# Patient Record
Sex: MALE
[De-identification: names, ages, dates, MRNs, and addresses within clinical notes are randomized per-mention and may not be internally consistent; named-entity substitution may affect disease eponyms.]

---

## 2019-11-14 ENCOUNTER — APPOINTMENT (OUTPATIENT)
Dept: HUMAN REPRODUCTION | Facility: CLINIC | Age: 49
End: 2019-11-14

## 2023-01-04 PROBLEM — Z00.00 ENCOUNTER FOR PREVENTIVE HEALTH EXAMINATION: Status: ACTIVE | Noted: 2023-01-04

## 2023-01-14 ENCOUNTER — APPOINTMENT (OUTPATIENT)
Dept: MRI IMAGING | Facility: IMAGING CENTER | Age: 53
End: 2023-01-14
Payer: SELF-PAY

## 2023-01-14 ENCOUNTER — OUTPATIENT (OUTPATIENT)
Dept: OUTPATIENT SERVICES | Facility: HOSPITAL | Age: 53
LOS: 1 days | End: 2023-01-14
Payer: SELF-PAY

## 2023-01-14 DIAGNOSIS — R97.20 ELEVATED PROSTATE SPECIFIC ANTIGEN [PSA]: ICD-10-CM

## 2023-01-14 PROCEDURE — 72197 MRI PELVIS W/O & W/DYE: CPT

## 2023-01-14 PROCEDURE — 76498 UNLISTED MR PROCEDURE: CPT

## 2023-01-14 PROCEDURE — 72197 MRI PELVIS W/O & W/DYE: CPT | Mod: 26

## 2023-01-14 PROCEDURE — 76498P: CUSTOM | Mod: 26

## 2023-01-14 PROCEDURE — A9585: CPT

## 2023-02-06 ENCOUNTER — APPOINTMENT (OUTPATIENT)
Dept: UROLOGY | Facility: CLINIC | Age: 53
End: 2023-02-06
Payer: COMMERCIAL

## 2023-02-06 VITALS
DIASTOLIC BLOOD PRESSURE: 89 MMHG | OXYGEN SATURATION: 97 % | SYSTOLIC BLOOD PRESSURE: 147 MMHG | HEART RATE: 77 BPM | BODY MASS INDEX: 41.75 KG/M2 | WEIGHT: 315 LBS | HEIGHT: 73 IN

## 2023-02-06 VITALS
BODY MASS INDEX: 42.66 KG/M2 | TEMPERATURE: 98.7 F | SYSTOLIC BLOOD PRESSURE: 165 MMHG | HEIGHT: 72 IN | HEART RATE: 111 BPM | WEIGHT: 315 LBS | OXYGEN SATURATION: 98 % | DIASTOLIC BLOOD PRESSURE: 86 MMHG

## 2023-02-06 VITALS — HEIGHT: 73 IN | BODY MASS INDEX: 44.86 KG/M2

## 2023-02-06 DIAGNOSIS — I10 ESSENTIAL (PRIMARY) HYPERTENSION: ICD-10-CM

## 2023-02-06 DIAGNOSIS — Z78.9 OTHER SPECIFIED HEALTH STATUS: ICD-10-CM

## 2023-02-06 DIAGNOSIS — Z83.3 FAMILY HISTORY OF DIABETES MELLITUS: ICD-10-CM

## 2023-02-06 DIAGNOSIS — E78.5 HYPERLIPIDEMIA, UNSPECIFIED: ICD-10-CM

## 2023-02-06 DIAGNOSIS — Z82.49 FAMILY HISTORY OF ISCHEMIC HEART DISEASE AND OTHER DISEASES OF THE CIRCULATORY SYSTEM: ICD-10-CM

## 2023-02-06 PROCEDURE — 99214 OFFICE O/P EST MOD 30 MIN: CPT

## 2023-02-06 RX ORDER — ATORVASTATIN CALCIUM 10 MG/1
10 TABLET, FILM COATED ORAL
Refills: 0 | Status: ACTIVE | COMMUNITY

## 2023-02-06 RX ORDER — SILDENAFIL 20 MG/1
TABLET ORAL
Refills: 0 | Status: ACTIVE | COMMUNITY

## 2023-02-06 RX ORDER — LISINOPRIL 20 MG/1
20 TABLET ORAL
Refills: 0 | Status: ACTIVE | COMMUNITY

## 2023-02-06 NOTE — HISTORY OF PRESENT ILLNESS
[FreeTextEntry1] : Dear CLINT Contreras\par \par Thank you so much for the referral to help care for your patient.\par \par Chief Complaint: Prostate Biopsy consult\par Date of first visit: 02/06/2023\par \par RUDDY NAVARRO is a 52 year old Black man with PMHx hypgonadism (testosterone pellets), HTN, HLD who presents for elevated PSA. His PSA is 4.29 n/ml. MRI on 1/14/23 demonstrates a PIRADS 4 lesion (left mid pzpl) and PIRADS 3 lesion right mid pzpl. He has bilateral pins in his hips. His PSA density is 0.16 ng/ml/cc. He is biopsy naive and denies any known family hx of  prostate cancer. \par \par Last testosterone pellets 12/23/22. His TT on 1/20/23 was 640. Has been taking for years.\par \par PSA Hx:  \par 4.29   12/23/2022. PSAD 0.16 ng/ml/cc\par 4.15   9/23/22 \par \par MRI Hx: \par MRI read 1/14/2023. 26 ml prostate with PIRADS 4 lesion #1 measuring 9 mm in the left, posterior lateral (PZpl), midgland, peripheral zone. PIRADS 3 lesion #2 measuring  measuring 11 mm in the right, posterior lateral (PZpl), midgland, peripheral zone. No LAD No EPE, No Bony Lesions.  The images have been reviewed and clinical implications discussed with the patient. \par \par 02/06/2023\par IPSS  1  QOL  0\par RADHA  19\par \par Prostate cancer screening: the patient and I spoke at length about prostate cancer screening, its risks and its benefits. The patient has 3 (age, race, PSA) risk factors for prostate cancer.  He understands that many men with prostate cancer will die with the disease rather than of it and we also discussed the results large multi-center American and  prostate cancer screening trials. He also understands that PSA in and of itself does not diagnose prostate cancer but only assesses risk to a certain degree. The patient understands that to definitively screen for prostate cancer, a biopsy is required and this procedure has risks, including bleeding, infection, ED and urinary retention. The patient opted to fusion biopsy. \par \par The patient denies fevers, chills, nausea and or vomiting and no unexplained weight loss.\par \par All pertinent laboratory, films and physician notes were reviewed.  Questionnaire results were discussed with patient.

## 2023-02-06 NOTE — ADDENDUM
[FreeTextEntry1] : I, Dr. Dexter, personally performed the evaluation and management (E/M) services for this new patient.  That E/M includes conducting the initial examination, assessing all conditions, and establishing the plan of care.  Today, my ACP, Osiris Woods, was here to observe my evaluation and management services for this patient to be followed going forward.\par \par IRB   MR/TRUS FUSION GUIDED PROSTATE BIOPSY- AN IMPROVED WAY TO DETECT AND QUANTIFY PROSTATE CANCER\par \par Inclusion criteria have been reviewed and it has been determined that the subject has met all inclusion criteria.\par Exclusion criteria have been reviewed and it has been determined that the subject does not meet any exclusion criteria.\par \par The following was discussed during the consent process:\par ·	The fact that the study involves research\par ·	The study schedule and procedures involved\par ·	The main risks of the study, and the fact that all risks may not be known at this time\par ·	New information that may affect the subject’s willingness to continue on the study will be presented as soon as it is available\par ·	Benefits of participating\par ·	Alternatives to participating\par ·	Confidentiality \par ·	Compensation for research-related injury\par ·	Contacts for questions about the study or their rights while on the study\par ·	The fact that the subject’s participation is voluntary - they can refuse or withdraw \par at any time without penalty or loss of benefits.\par \par The subject was given ample time to ask questions prior to signing - all questions were answered to the subject’s satisfaction.\par \par Contact information for research staff was given to the subject.	\par The subject has expressed his/her willingness to participate.	\par \par Opportunity to sign the consent electronically was offered to the subject. Study team to contact the subject to assist with e-consenting though the [employer] platform. \par \par OR  \par \par Subject will sign printed consent on day of procedure.  \par \par

## 2023-02-06 NOTE — PHYSICAL EXAM
[Prostate Tenderness] : the prostate was not tender [No Prostate Nodules] : no prostate nodules [General Appearance - Well Developed] : well developed [General Appearance - Well Nourished] : well nourished [Normal Appearance] : normal appearance [Well Groomed] : well groomed [General Appearance - In No Acute Distress] : no acute distress [] : no respiratory distress [Respiration, Rhythm And Depth] : normal respiratory rhythm and effort [Exaggerated Use Of Accessory Muscles For Inspiration] : no accessory muscle use [Costovertebral Angle Tenderness] : no ~M costovertebral angle tenderness [Urethral Meatus] : meatus normal [Penis Abnormality] : normal circumcised penis [Urinary Bladder Findings] : the bladder was normal on palpation [Scrotum] : the scrotum was normal [Testes Tenderness] : no tenderness of the testes [Testes Mass (___cm)] : there were no testicular masses [Normal Station and Gait] : the gait and station were normal for the patient's age [No Focal Deficits] : no focal deficits [Oriented To Time, Place, And Person] : oriented to person, place, and time [Affect] : the affect was normal [Mood] : the mood was normal [Not Anxious] : not anxious [No Palpable Adenopathy] : no palpable adenopathy [FreeTextEntry1] : bilat 5cc testes

## 2023-02-06 NOTE — ASSESSMENT
[FreeTextEntry1] : 51 yo male with elevated PSA 4.29 ng/ml, MRI with PIRADS 4 and PIRADS 3 lesion, elevated PSAD 0.16 ng/ml/cc, biopsy naive, neg FH, neg FERNANDO. \par \par 1. Book for biopsy\par 2. His BMI is 45-- Dr Wang cleared for [facility]\par \par IRB Notification\par \par The patient has been made aware of the opportunity to participate in our MR US fusion guided biopsy trial testing the next generation biopsy technology.  This is an IRB approved trial (IRB #).  He is already being consented for a standard MR US fusion guided biopsy therefore there is no change in his clinical work flow.  He has been given a copy of the consent to review before the biopsy date and given an opportunity to contact us with any further questions.  He will be consented on the day of the procedure or electronically before the biopsy.\par \par He understands that many men with prostate cancer will die with the disease rather than of it and we also discussed the results large multi-center American and  prostate cancer screening trials. He also understands that PSA in and of itself does not diagnose prostate cancer but only assesses risk to a certain degree. The patient understands that to definitively screen for prostate cancer, a biopsy is required and this procedure has risks, including bleeding, infection, ED and urinary retention. The patient opted to move forward with the biopsy.\par \par The patient is aware to expect hematuria x 2 weeks and upto 4 weeks of hematospermia.  There is a risk of infection albeit much lower than a transrectal approach. In some cases patients can experience erectile dysfunction but this is usually self limiting.  Any fever/chills after the biopsy the patient is to contact the office and go to the ER for an immediate evaluation. He has been given paper instructions outlining these items - which includes medications to avoid prior to surgery.\par \par 1. CBC, BMP, PSA, Covid Test, UA UCx. EKG  \par 2. Medical Clearance\par 3. TP biopsy at [facility]\par 4. follow up 2 weeks after biopsy with his primary urologist or ourselves.\par 5. we will call with the path results once they are resulted.\par \par Thank you very much for allowing me to assist in the care of this patient. Should you have any additional questions or concerns please do not hesitate to contact me.\par \par \par Sincerely,\par \par \par Eldon Dexter D.O.\par Professor of Urology and Radiology\par  of Urology at [facility]\par System Director for Prostate Cancer\par [address] [place] [address], [address] [facility], [address]\par Phone: [phone]\par \par

## 2023-02-07 ENCOUNTER — NON-APPOINTMENT (OUTPATIENT)
Age: 53
End: 2023-02-07

## 2023-02-07 LAB
ANION GAP SERPL CALC-SCNC: 13 MMOL/L
APPEARANCE: CLEAR
BACTERIA: NEGATIVE
BASOPHILS # BLD AUTO: 0.06 K/UL
BASOPHILS NFR BLD AUTO: 0.7 %
BILIRUBIN URINE: NEGATIVE
BLOOD URINE: NEGATIVE
BUN SERPL-MCNC: 18 MG/DL
CALCIUM SERPL-MCNC: 10.6 MG/DL
CHLORIDE SERPL-SCNC: 98 MMOL/L
CO2 SERPL-SCNC: 26 MMOL/L
COLOR: YELLOW
CREAT SERPL-MCNC: 1.35 MG/DL
EGFR: 62 ML/MIN/1.73M2
EOSINOPHIL # BLD AUTO: 0.19 K/UL
EOSINOPHIL NFR BLD AUTO: 2.3 %
GLUCOSE QUALITATIVE U: NEGATIVE
GLUCOSE SERPL-MCNC: 106 MG/DL
HCT VFR BLD CALC: 51.3 %
HGB BLD-MCNC: 16.7 G/DL
HYALINE CASTS: 0 /LPF
IMM GRANULOCYTES NFR BLD AUTO: 0.2 %
KETONES URINE: NEGATIVE
LEUKOCYTE ESTERASE URINE: NEGATIVE
LYMPHOCYTES # BLD AUTO: 2.14 K/UL
LYMPHOCYTES NFR BLD AUTO: 26 %
MAN DIFF?: NORMAL
MCHC RBC-ENTMCNC: 31.9 PG
MCHC RBC-ENTMCNC: 32.6 GM/DL
MCV RBC AUTO: 98.1 FL
MICROSCOPIC-UA: NORMAL
MONOCYTES # BLD AUTO: 0.65 K/UL
MONOCYTES NFR BLD AUTO: 7.9 %
NEUTROPHILS # BLD AUTO: 5.18 K/UL
NEUTROPHILS NFR BLD AUTO: 62.9 %
NITRITE URINE: NEGATIVE
PH URINE: 6
PLATELET # BLD AUTO: 344 K/UL
POTASSIUM SERPL-SCNC: 4.3 MMOL/L
PROTEIN URINE: NORMAL
PSA SERPL-MCNC: 6.49 NG/ML
RBC # BLD: 5.23 M/UL
RBC # FLD: 13.3 %
RED BLOOD CELLS URINE: 2 /HPF
SODIUM SERPL-SCNC: 136 MMOL/L
SPECIFIC GRAVITY URINE: 1.03
SQUAMOUS EPITHELIAL CELLS: 0 /HPF
UROBILINOGEN URINE: NORMAL
WBC # FLD AUTO: 8.24 K/UL
WHITE BLOOD CELLS URINE: 1 /HPF

## 2023-02-08 LAB — BACTERIA UR CULT: NORMAL

## 2023-02-13 ENCOUNTER — APPOINTMENT (OUTPATIENT)
Dept: UROLOGY | Facility: CLINIC | Age: 53
End: 2023-02-13
Payer: COMMERCIAL

## 2023-02-13 VITALS
TEMPERATURE: 97.5 F | HEART RATE: 87 BPM | OXYGEN SATURATION: 96 % | DIASTOLIC BLOOD PRESSURE: 81 MMHG | SYSTOLIC BLOOD PRESSURE: 149 MMHG

## 2023-02-13 PROCEDURE — 99214 OFFICE O/P EST MOD 30 MIN: CPT

## 2023-02-13 NOTE — ASSESSMENT
[FreeTextEntry1] : 51 yo male with elevated PSA 4.29 ng/ml, MRI with PIRADS 4 and PIRADS 3 lesion, elevated PSAD 0.16 ng/ml/cc, biopsy naive, neg FH, neg FERNANDO. \par \par 1. Book for biopsy\par 2. His BMI is 45-- Dr Wang cleared for [facility]\par \par IRB Notification\par \par The patient has been made aware of the opportunity to participate in our MR US fusion guided biopsy trial testing the next generation biopsy technology.  This is an IRB approved trial (IRB #).  He is already being consented for a standard MR US fusion guided biopsy therefore there is no change in his clinical work flow.  He has been given a copy of the consent to review before the biopsy date and given an opportunity to contact us with any further questions.  He will be consented on the day of the procedure or electronically before the biopsy.\par \par He understands that many men with prostate cancer will die with the disease rather than of it and we also discussed the results large multi-center American and  prostate cancer screening trials. He also understands that PSA in and of itself does not diagnose prostate cancer but only assesses risk to a certain degree. The patient understands that to definitively screen for prostate cancer, a biopsy is required and this procedure has risks, including bleeding, infection, ED and urinary retention. The patient opted to move forward with the biopsy.\par \par The patient is aware to expect hematuria x 2 weeks and upto 4 weeks of hematospermia.  There is a risk of infection albeit much lower than a transrectal approach. In some cases patients can experience erectile dysfunction but this is usually self limiting.  Any fever/chills after the biopsy the patient is to contact the office and go to the ER for an immediate evaluation. He has been given paper instructions outlining these items - which includes medications to avoid prior to surgery.\par \par 1. CBC, BMP, PSA, Covid Test, UA UCx. EKG  \par 2. Medical Clearance\par 3. TP biopsy at [facility]\par 4. follow up 2 weeks after biopsy with his primary urologist or ourselves.\par 5. we will call with the path results once they are resulted.\par \par Thank you very much for allowing me to assist in the care of this patient. Should you have any additional questions or concerns please do not hesitate to contact me.\par \par \par Sincerely,\par \par \par Eldon Dexter D.O.\par Professor of Urology and Radiology\par  of Urology at [facility]\par System Director for Prostate Cancer\par [address] [place] [address], [address] Floor [facility], [address]\par Phone: [phone]\par \par

## 2023-02-13 NOTE — HISTORY OF PRESENT ILLNESS
[FreeTextEntry1] : Dear CLINT Contreras\par \par Thank you so much for the referral to help care for your patient.\par \par Chief Complaint: Prostate Biopsy consult\par Date of first visit: 02/06/2023\par \par RUDDY NAVARRO is a 52 year old Black man with PMHx hypgonadism (testosterone pellets), HTN, HLD who presents for elevated PSA. His PSA is 4.29 n/ml. MRI on 1/14/23 demonstrates a PIRADS 4 lesion (left mid pzpl) and PIRADS 3 lesion right mid pzpl. He has bilateral pins in his hips. His PSA density is 0.16 ng/ml/cc. He is biopsy naive and denies any known family hx of  prostate cancer. \par \par patient was unsure about having a biopsy and wanted to review all data and MRI scans together.\par \par Last testosterone pellets 12/23/22. His TT on 1/20/23 was 640. Has been taking for years.\par \par PSA Hx:  \par 4.29   12/23/2022. PSAD 0.16 ng/ml/cc\par 4.15   9/23/22 \par \par MRI Hx: \par MRI read 1/14/2023. 26 ml prostate with PIRADS 4 lesion #1 measuring 9 mm in the left, posterior lateral (PZpl), midgland, peripheral zone. PIRADS 3 lesion #2 measuring  measuring 11 mm in the right, posterior lateral (PZpl), midgland, peripheral zone. No LAD No EPE, No Bony Lesions.  The images have been reviewed and clinical implications discussed with the patient. \par \par 02/13/2023\par IPSS  0  QOL 0\par RADHA 21\par \par 02/06/2023\par IPSS  1  QOL  0\par RADHA  19\par \par Prostate cancer screening: the patient and I spoke at length about prostate cancer screening, its risks and its benefits. The patient has 3 (age, race, PSA) risk factors for prostate cancer.  He understands that many men with prostate cancer will die with the disease rather than of it and we also discussed the results large multi-center American and  prostate cancer screening trials. He also understands that PSA in and of itself does not diagnose prostate cancer but only assesses risk to a certain degree. The patient understands that to definitively screen for prostate cancer, a biopsy is required and this procedure has risks, including bleeding, infection, ED and urinary retention. The patient opted to fusion biopsy. \par \par The patient denies fevers, chills, nausea and or vomiting and no unexplained weight loss.\par \par All pertinent laboratory, films and physician notes were reviewed.  Questionnaire results were discussed with patient.\par \par I spent 31 minutes of non-face to face time reviewing the patient's records, chart, uploading processing MR images, transferring MR images from PACS to an independent workstation, reviewing images on independent workstation, speaking with their physician and planning their prostate biopsy and preparation of an upcoming visit for 02/13/2023 .  The imaging and planning was highly complex and took this entire time. \par

## 2023-02-14 ENCOUNTER — TRANSCRIPTION ENCOUNTER (OUTPATIENT)
Age: 53
End: 2023-02-14

## 2023-02-18 ENCOUNTER — OUTPATIENT (OUTPATIENT)
Dept: OUTPATIENT SERVICES | Facility: HOSPITAL | Age: 53
LOS: 1 days | End: 2023-02-18
Payer: SELF-PAY

## 2023-02-18 DIAGNOSIS — R97.20 ELEVATED PROSTATE SPECIFIC ANTIGEN [PSA]: ICD-10-CM

## 2023-02-18 PROCEDURE — C8001: CPT

## 2023-02-18 PROCEDURE — 76377 3D RENDER W/INTRP POSTPROCES: CPT | Mod: 26

## 2023-02-23 ENCOUNTER — APPOINTMENT (OUTPATIENT)
Dept: UROLOGY | Facility: AMBULATORY SURGERY CENTER | Age: 53
End: 2023-02-23

## 2023-03-01 ENCOUNTER — NON-APPOINTMENT (OUTPATIENT)
Age: 53
End: 2023-03-01

## 2023-03-03 ENCOUNTER — APPOINTMENT (OUTPATIENT)
Dept: UROLOGY | Facility: CLINIC | Age: 53
End: 2023-03-03
Payer: COMMERCIAL

## 2023-03-03 VITALS — SYSTOLIC BLOOD PRESSURE: 138 MMHG | DIASTOLIC BLOOD PRESSURE: 76 MMHG

## 2023-03-03 PROCEDURE — 99214 OFFICE O/P EST MOD 30 MIN: CPT

## 2023-03-04 NOTE — HISTORY OF PRESENT ILLNESS
[FreeTextEntry1] : Adria Zamudio presents to the office today.  He is a 52-year-old man with elevated PSA of 6.49 ng/mL.  MRI of the prostate was performed for further assessment demonstrating a prostate size of 26 cm³.  Calculated PSA density of 0.25.  He was noted to have a left mid gland posterior peripheral zone lateral lesion measuring 9 x 6 x 9 mm, PI-RADS category 4.  A second lesion was seen in the prostate at the right mid gland posterior lateral peripheral zone measuring 11 x 7 x 8 mm, PI-RADS category 3.  He has been recommended to undergo prostate biopsy.  He had been scheduled to do so under anesthesia with Dr. Dexter but apparently due to his BMI, presurgical testing would not clear him for the procedure under anesthesia at [facility].  He is here today to discuss a biopsy done under local anesthesia here in the office.\par \par The patient does not know of any family history of prostate cancer.  He has never had prior prostate biopsies or imaging prior to the recent study.

## 2023-03-04 NOTE — ASSESSMENT
[FreeTextEntry1] : With abnormal lesions on the prostate MRI and elevated PSA density, I do believe the patient has clear indication for prostate biopsy in this setting.  \par \par Transperineal fusion biopsy of the prostate was discussed today with the patient in detail. I have explained that the transperineal approach is used to help minimize infection risk as it does carry a much lower risk of infection as compared with transrectal biopsy of the prostate. In addition, the fusion biopsy involves use of the pre-existing MRI of the prostate which is correlated with a ultrasound generated 3-D map of the prostate which is created and real-time during the time of the biopsy procedure. Imaging calibration is used to help target these specific areas noted by MRI to be abnormal and biopsy samples were taken from the abnormal areas in addition to biopsy of additional areas of the prostate to confirm the absence of clinically significant prostate cancer elsewhere that may not have been detected by MRI. I have explained that the procedure usually requires approximately 30 minutes to perform and preparation involves the use of a Fleet enema prior to the procedure to help maximize visualization of the prostate by ultrasound during the procedure. I also reviewed with the patient that it is expected that he will have blood in the urine intermittently for approximately one week after the procedure and he may also experienced blood in his semen for several weeks afterward as well.\par \par He communicates his understanding of the plan as outlined and is in agreement with moving forward.  I have given him written biopsy instructions which review the details outlined above.\par \par He is scheduled for his biopsy next week and understands the results will take approximately 1 week to come back.

## 2023-03-07 ENCOUNTER — NON-APPOINTMENT (OUTPATIENT)
Age: 53
End: 2023-03-07

## 2023-03-09 ENCOUNTER — APPOINTMENT (OUTPATIENT)
Dept: UROLOGY | Facility: CLINIC | Age: 53
End: 2023-03-09
Payer: COMMERCIAL

## 2023-03-09 ENCOUNTER — OUTPATIENT (OUTPATIENT)
Dept: OUTPATIENT SERVICES | Facility: HOSPITAL | Age: 53
LOS: 1 days | End: 2023-03-09
Payer: COMMERCIAL

## 2023-03-09 VITALS — TEMPERATURE: 98 F | DIASTOLIC BLOOD PRESSURE: 77 MMHG | SYSTOLIC BLOOD PRESSURE: 125 MMHG | HEART RATE: 75 BPM

## 2023-03-09 DIAGNOSIS — R97.20 ELEVATED PROSTATE, SPECIFIC ANTIGEN [PSA]: ICD-10-CM

## 2023-03-09 DIAGNOSIS — R93.5 ABNORMAL FINDINGS ON DIAGNOSTIC IMAGING OF OTHER ABDOMINAL REGIONS, INCLUDING RETROPERITONEUM: ICD-10-CM

## 2023-03-09 DIAGNOSIS — R35.0 FREQUENCY OF MICTURITION: ICD-10-CM

## 2023-03-09 PROCEDURE — 55700: CPT | Mod: 22

## 2023-03-09 PROCEDURE — 76942 ECHO GUIDE FOR BIOPSY: CPT | Mod: 26,59

## 2023-03-09 PROCEDURE — 76942 ECHO GUIDE FOR BIOPSY: CPT | Mod: 59

## 2023-03-09 PROCEDURE — 55700: CPT

## 2023-03-09 PROCEDURE — 76377 3D RENDER W/INTRP POSTPROCES: CPT | Mod: 26

## 2023-03-10 ENCOUNTER — NON-APPOINTMENT (OUTPATIENT)
Age: 53
End: 2023-03-10

## 2023-03-13 DIAGNOSIS — R93.5 ABNORMAL FINDINGS ON DIAGNOSTIC IMAGING OF OTHER ABDOMINAL REGIONS, INCLUDING RETROPERITONEUM: ICD-10-CM

## 2023-03-13 DIAGNOSIS — R97.20 ELEVATED PROSTATE SPECIFIC ANTIGEN [PSA]: ICD-10-CM

## 2023-03-15 ENCOUNTER — TRANSCRIPTION ENCOUNTER (OUTPATIENT)
Age: 53
End: 2023-03-15

## 2023-03-15 LAB — PROSTATE BIOPSY: NORMAL

## 2023-03-16 ENCOUNTER — APPOINTMENT (OUTPATIENT)
Dept: UROLOGY | Facility: CLINIC | Age: 53
End: 2023-03-16
Payer: COMMERCIAL

## 2023-03-16 VITALS
BODY MASS INDEX: 41.75 KG/M2 | HEART RATE: 84 BPM | SYSTOLIC BLOOD PRESSURE: 145 MMHG | HEIGHT: 73 IN | TEMPERATURE: 98 F | WEIGHT: 315 LBS | DIASTOLIC BLOOD PRESSURE: 79 MMHG

## 2023-03-16 PROCEDURE — 99215 OFFICE O/P EST HI 40 MIN: CPT

## 2023-03-16 NOTE — HISTORY OF PRESENT ILLNESS
[FreeTextEntry1] : Adria Zamudio returns to the office today.  He is 52 years old and recently status post prostate biopsy.  PSA level was 6.49 ng/mL prior to the procedure.  MRI of the prostate showed 2 lesions, PI-RADS category 4 and PI-RADS category 3.  He underwent a transperineal fusion biopsy of the prostate in the office here last week and is now back to review the results with me.  Findings have shown fairly high volume disease, all intermediate risk and a mixture of Tripp 4+3 and 3+4 adenocarcinoma.  A total of 14 of 21 biopsy samples taken were positive.  Up to 90% core length involved.  The MRI of the prostate had shown the prostate to be 26 cm³.  No evidence of extraprostatic extension was noted on the MRI including the absence of any neurovascular bundle involvement, seminal vesicle invasion, or pelvic adenopathy.\par \par Prior to the visit today, I had spoken with the patient to inform him of the diagnosis and sent him some information to read regarding potential treatment options for clinically localized prostate cancer.

## 2023-03-16 NOTE — DISEASE MANAGEMENT
[1] : T1 [c] : c [0] : N0 [X] : MX [0-10] : 0 -10 ng/mL [7(4+3)] : Fusion Biopsy [place] Score: 7(4+3) [Biopsy results sent to PCP/Referring Physician] : Biopsy results sent to PCP/Referring Physician [] : Patient had a Prostate MRI [4] : 4 [IIC] : IIC [BiopsyDate] : 03/23 [MeasuredProstateVolume] : 26 [TotalCores] : 21 [TotalPositiveCores] : 14 [MaxCoreInvolvement] : 90

## 2023-03-17 ENCOUNTER — NON-APPOINTMENT (OUTPATIENT)
Age: 53
End: 2023-03-17

## 2023-03-20 ENCOUNTER — TRANSCRIPTION ENCOUNTER (OUTPATIENT)
Age: 53
End: 2023-03-20

## 2023-07-04 PROBLEM — C61 PROSTATE CA: Status: ACTIVE | Noted: 2023-03-15

## 2023-07-06 ENCOUNTER — APPOINTMENT (OUTPATIENT)
Dept: UROLOGY | Facility: CLINIC | Age: 53
End: 2023-07-06
Payer: COMMERCIAL

## 2023-07-06 VITALS
RESPIRATION RATE: 16 BRPM | HEIGHT: 71 IN | HEART RATE: 79 BPM | DIASTOLIC BLOOD PRESSURE: 82 MMHG | SYSTOLIC BLOOD PRESSURE: 132 MMHG | WEIGHT: 312 LBS | BODY MASS INDEX: 43.68 KG/M2

## 2023-07-06 DIAGNOSIS — C61 MALIGNANT NEOPLASM OF PROSTATE: ICD-10-CM

## 2023-07-06 DIAGNOSIS — N13.5 CROSSING VESSEL AND STRICTURE OF URETER W/OUT HYDRONEPHROSIS: ICD-10-CM

## 2023-07-06 PROCEDURE — 99215 OFFICE O/P EST HI 40 MIN: CPT

## 2023-07-06 NOTE — DISEASE MANAGEMENT
[1] : T1 [c] : c [0] : N0 [X] : MX [0-10] : 0 -10 ng/mL [7(4+3)] : Fusion Biopsy Eunice Score: 7(4+3) [Biopsy results sent to PCP/Referring Physician] : Biopsy results sent to PCP/Referring Physician [] : Patient had a Prostate MRI [4] : 4 [IIC] : IIC [BiopsyDate] : 03/23 [MeasuredProstateVolume] : 26 [TotalCores] : 21 [TotalPositiveCores] : 14 [MaxCoreInvolvement] : 90

## 2023-07-06 NOTE — HISTORY OF PRESENT ILLNESS
[FreeTextEntry1] : Adria Zamudio returns to the office today.  He is a 53-year-old man diagnosed with prostate cancer on biopsy in March of this year.  He is a primary patient of Dr. Parth Louie who had referred him for biopsy.  The biopsy demonstrated multifocal [place] 4+3 and 3+4 adenocarcinoma the prostate.  MRI of the prostate done in January of this year had shown a PI-RADS category 4 and PI-RADS category 3 lesions.  The prostate measured 26 cm³.  No evidence of extraprostatic extension was identified.  The PSA level done most recently in February 2023 was 6.49 ng/mL.\par \par I had reviewed the biopsy findings with the patient by phone after his procedure and advised the patient to discuss further with his primary urologist.  I did feel that treatment was clearly indicated and told the patient as such.\par \par The patient has since sought consultation with another urologist (Artie Leslie) who told him that his BMI was too high for surgery.  He has also seen a radiation oncologist who has recommended radiation therapy.  The patient would much prefer surgery over radiation therapy.  He has also undergone some additional imaging work-up with CT scan of the abdomen and pelvis as well as a PET scan.  The PET scan did not show any evidence of disease outside of the prostate but did show some mild activity in pelvic and retroperitoneal lymph nodes which was deemed to be inconclusive.  The CT scan however demonstrated a finding of right hydronephrosis with a transition point in the proximal right ureter of unclear etiology.  There was no associated stone or mass lesion noted.  The patient had been previously unaware of that finding.  He has no prior history of stone disease or instrumentation of the urinary tract.  No history of pyelonephritis.  He states that he currently has a ureteroscopy scheduled with another urologist to assess that ureteral finding and determine other steps and its management.